# Patient Record
Sex: MALE | Race: OTHER | HISPANIC OR LATINO
[De-identification: names, ages, dates, MRNs, and addresses within clinical notes are randomized per-mention and may not be internally consistent; named-entity substitution may affect disease eponyms.]

---

## 2023-10-10 ENCOUNTER — APPOINTMENT (OUTPATIENT)
Dept: PULMONOLOGY | Facility: CLINIC | Age: 38
End: 2023-10-10
Payer: MEDICAID

## 2023-10-10 VITALS
DIASTOLIC BLOOD PRESSURE: 72 MMHG | OXYGEN SATURATION: 97 % | HEIGHT: 66 IN | WEIGHT: 212 LBS | HEART RATE: 84 BPM | RESPIRATION RATE: 16 BRPM | BODY MASS INDEX: 34.07 KG/M2 | SYSTOLIC BLOOD PRESSURE: 125 MMHG

## 2023-10-10 DIAGNOSIS — Z82.49 FAMILY HISTORY OF ISCHEMIC HEART DISEASE AND OTHER DISEASES OF THE CIRCULATORY SYSTEM: ICD-10-CM

## 2023-10-10 DIAGNOSIS — Z78.9 OTHER SPECIFIED HEALTH STATUS: ICD-10-CM

## 2023-10-10 DIAGNOSIS — Z86.59 PERSONAL HISTORY OF OTHER MENTAL AND BEHAVIORAL DISORDERS: ICD-10-CM

## 2023-10-10 DIAGNOSIS — Z87.898 PERSONAL HISTORY OF OTHER SPECIFIED CONDITIONS: ICD-10-CM

## 2023-10-10 DIAGNOSIS — Z86.79 PERSONAL HISTORY OF OTHER DISEASES OF THE CIRCULATORY SYSTEM: ICD-10-CM

## 2023-10-10 DIAGNOSIS — Z83.3 FAMILY HISTORY OF DIABETES MELLITUS: ICD-10-CM

## 2023-10-10 DIAGNOSIS — R53.83 OTHER FATIGUE: ICD-10-CM

## 2023-10-10 PROBLEM — Z00.00 ENCOUNTER FOR PREVENTIVE HEALTH EXAMINATION: Status: ACTIVE | Noted: 2023-10-10

## 2023-10-10 PROCEDURE — 99204 OFFICE O/P NEW MOD 45 MIN: CPT

## 2023-10-17 ENCOUNTER — OUTPATIENT (OUTPATIENT)
Dept: OUTPATIENT SERVICES | Facility: HOSPITAL | Age: 38
LOS: 1 days | End: 2023-10-17
Payer: COMMERCIAL

## 2023-10-17 DIAGNOSIS — G47.33 OBSTRUCTIVE SLEEP APNEA (ADULT) (PEDIATRIC): ICD-10-CM

## 2023-10-17 PROCEDURE — 95800 SLP STDY UNATTENDED: CPT

## 2023-10-17 PROCEDURE — 95800 SLP STDY UNATTENDED: CPT | Mod: 26

## 2023-12-10 ENCOUNTER — OUTPATIENT (OUTPATIENT)
Dept: OUTPATIENT SERVICES | Facility: HOSPITAL | Age: 38
LOS: 1 days | End: 2023-12-10
Payer: COMMERCIAL

## 2023-12-10 ENCOUNTER — APPOINTMENT (OUTPATIENT)
Dept: RADIOLOGY | Facility: IMAGING CENTER | Age: 38
End: 2023-12-10

## 2023-12-10 ENCOUNTER — RESULT REVIEW (OUTPATIENT)
Age: 38
End: 2023-12-10

## 2023-12-10 DIAGNOSIS — Z00.8 ENCOUNTER FOR OTHER GENERAL EXAMINATION: ICD-10-CM

## 2023-12-10 PROCEDURE — 71046 X-RAY EXAM CHEST 2 VIEWS: CPT

## 2023-12-10 PROCEDURE — 71046 X-RAY EXAM CHEST 2 VIEWS: CPT | Mod: 26

## 2023-12-29 ENCOUNTER — APPOINTMENT (OUTPATIENT)
Dept: PULMONOLOGY | Facility: CLINIC | Age: 38
End: 2023-12-29
Payer: MEDICAID

## 2023-12-29 VITALS
RESPIRATION RATE: 16 BRPM | HEART RATE: 74 BPM | DIASTOLIC BLOOD PRESSURE: 78 MMHG | OXYGEN SATURATION: 97 % | SYSTOLIC BLOOD PRESSURE: 140 MMHG

## 2023-12-29 VITALS — WEIGHT: 218 LBS | HEIGHT: 64 IN | BODY MASS INDEX: 37.22 KG/M2

## 2023-12-29 DIAGNOSIS — E66.9 OBESITY, UNSPECIFIED: ICD-10-CM

## 2023-12-29 DIAGNOSIS — Z78.9 OTHER SPECIFIED HEALTH STATUS: ICD-10-CM

## 2023-12-29 DIAGNOSIS — G47.33 OBSTRUCTIVE SLEEP APNEA (ADULT) (PEDIATRIC): ICD-10-CM

## 2023-12-29 DIAGNOSIS — R06.02 SHORTNESS OF BREATH: ICD-10-CM

## 2023-12-29 PROCEDURE — 94010 BREATHING CAPACITY TEST: CPT

## 2023-12-29 PROCEDURE — 99214 OFFICE O/P EST MOD 30 MIN: CPT | Mod: 25

## 2023-12-29 NOTE — REVIEW OF SYSTEMS
[Fatigue] : fatigue [SOB on Exertion] : sob on exertion [GERD] : gerd [Depression] : depression [Obesity] : obesity [Negative] : Neurologic

## 2023-12-29 NOTE — HISTORY OF PRESENT ILLNESS
[Excessive Daytime Sleepiness] : excessive daytime sleepiness [Witnessed Apnea During Sleep] : witnessed apnea during sleep [Witnessed Gasping During Sleep] : witnessed gasping during sleep [Snoring] : snoring [Unrefreshing Sleep] : unrefreshing sleep [Sleepy When Sedentary] : sleepy when sedentary [Follow-Up - Routine Clinic] : a routine clinic follow-up of [Excess Weight] : excess weight [Currently Experiencing] : The patient is currently experiencing symptoms. [Dyspnea] : dyspnea [Low Calorie Diet] : low calorie diet [Fair Compliance] : fair compliance with treatment [Fair Tolerance] : fair tolerance of treatment [Poor Symptom Control] : poor symptom control [None] : No associated conditions are noted [High] : high [Low Calorie] : low calorie [Well Balanced Diet] : well balanced meals [___ Times/Week] : exercises [unfilled] times per week [Aerobic Conditioning] : aerobic conditioning [TextBox_4] : Never smoked. Reports poor sleep. [ESS] : 11

## 2023-12-29 NOTE — PHYSICAL EXAM
[No Acute Distress] : no acute distress [Low Lying Soft Palate] : low lying soft palate [Enlarged Base of the Tongue] : enlarged base of the tongue [II] : Mallampati Class: II [Normal Appearance] : normal appearance [Supple] : supple [Normal Rate/Rhythm] : normal rate/rhythm [Normal S1, S2] : normal s1, s2 [No Murmurs] : no murmurs [No Resp Distress] : no resp distress [No Acc Muscle Use] : no acc muscle use [Normal Rhythm and Effort] : normal rhythm and effort [Clear to Auscultation Bilaterally] : clear to auscultation bilaterally [No Abnormalities] : no abnormalities [Benign] : benign [Not Tender] : not tender [Soft] : soft [No Clubbing] : no clubbing [No Edema] : no edema [No Focal Deficits] : no focal deficits [Oriented x3] : oriented x3 [TextBox_11] : Moderate oropharyngeal crowding.

## 2023-12-29 NOTE — DISCUSSION/SUMMARY
[FreeTextEntry1] : #1. Nhan from 12/29/23 was normal. #2. The patient does not appear to require chronic BD therapy at this time. #3. Diet and exercise for weight loss. #4. SOBOE is likely at least somewhat related to weight or deconditioning.   #5. Start autoCPAP to treat moderate SERGE with an AHI of 16.5; encouraged at least 70% compliance. #6. Replace PAP equipment as needed; ordered 12/29/23. #7. CXR to evaluate SOBOE was clear on 12/29/23.. #8. Pt had both Covid vaccines. #9. F/u in 2 months with compliance. #10. Referred patient for evaluation by obesity medicine. #11. Consider dental evaluation for an oral appliance as alternative therapy for treatment of SERGE if clinically indicated or if does not tolerate CPAP.   The patient expressed understanding and agreement with the above recommendations/plan and accepts responsibility to be compliant with recommended testing, therapies, and f/u visits. All relevant questions and concerns were addressed.  Discussed above with patient and aunt who was also present.

## 2023-12-29 NOTE — REASON FOR VISIT
[Follow-Up] : a follow-up visit [Sleep Apnea] : sleep apnea [Shortness of Breath] : shortness of breath [Obesity] : obesity [Ad Hoc ] : provided by an ad hoc  [Interpreters_FullName] : Lino [Interpreters_Relationshiptopatient] : MA [TWNoteComboBox1] : [place]

## 2023-12-29 NOTE — END OF VISIT
[Time Spent: ___ minutes] : I have spent [unfilled] minutes of time on the encounter. [TextEntry] : Discussed with pt at length regarding SERGE, obesity, fatigue, soboe; reviewed w/u with pt as above.

## 2023-12-29 NOTE — CONSULT LETTER
[Dear  ___] : Dear  [unfilled], [Consult Letter:] : I had the pleasure of evaluating your patient, [unfilled]. [Please see my note below.] : Please see my note below. [Consult Closing:] : Thank you very much for allowing me to participate in the care of this patient.  If you have any questions, please do not hesitate to contact me. [Sincerely,] : Sincerely, [FreeTextEntry3] : Kan Agarwal MD, FCCP, D. [facility] Pulmonary and Sleep Medicine [facility] at [place]

## 2023-12-29 NOTE — RESULTS/DATA
decreased ROM/decreased strength
decreased strength
[TextEntry] : CXR from 10/10/23 was clear - reviewed results and films with patient. Home PSG from 10/17/23 revealed moderate SERGE with an AHI of 16.5.

## 2024-02-27 ENCOUNTER — APPOINTMENT (OUTPATIENT)
Dept: PULMONOLOGY | Facility: CLINIC | Age: 39
End: 2024-02-27

## 2024-07-10 ENCOUNTER — APPOINTMENT (OUTPATIENT)
Dept: SURGERY | Facility: CLINIC | Age: 39
End: 2024-07-10
Payer: COMMERCIAL

## 2024-07-10 ENCOUNTER — APPOINTMENT (OUTPATIENT)
Dept: BARIATRICS/WEIGHT MGMT | Facility: CLINIC | Age: 39
End: 2024-07-10

## 2024-07-10 VITALS
RESPIRATION RATE: 16 BRPM | BODY MASS INDEX: 36.4 KG/M2 | DIASTOLIC BLOOD PRESSURE: 87 MMHG | OXYGEN SATURATION: 96 % | TEMPERATURE: 98.2 F | HEART RATE: 85 BPM | HEIGHT: 64.5 IN | WEIGHT: 215.8 LBS | SYSTOLIC BLOOD PRESSURE: 137 MMHG

## 2024-07-10 DIAGNOSIS — Z78.9 OTHER SPECIFIED HEALTH STATUS: ICD-10-CM

## 2024-07-10 DIAGNOSIS — E66.01 MORBID (SEVERE) OBESITY DUE TO EXCESS CALORIES: ICD-10-CM

## 2024-07-10 DIAGNOSIS — G47.33 OBSTRUCTIVE SLEEP APNEA (ADULT) (PEDIATRIC): ICD-10-CM

## 2024-07-10 PROCEDURE — 99204 OFFICE O/P NEW MOD 45 MIN: CPT

## 2024-07-15 ENCOUNTER — APPOINTMENT (OUTPATIENT)
Dept: INTERNAL MEDICINE | Facility: CLINIC | Age: 39
End: 2024-07-15
Payer: MEDICAID

## 2024-07-15 VITALS
HEIGHT: 64.5 IN | TEMPERATURE: 98.6 F | WEIGHT: 216 LBS | SYSTOLIC BLOOD PRESSURE: 132 MMHG | HEART RATE: 76 BPM | OXYGEN SATURATION: 98 % | DIASTOLIC BLOOD PRESSURE: 80 MMHG | BODY MASS INDEX: 36.43 KG/M2

## 2024-07-15 PROCEDURE — 99203 OFFICE O/P NEW LOW 30 MIN: CPT

## 2024-07-16 ENCOUNTER — NON-APPOINTMENT (OUTPATIENT)
Age: 39
End: 2024-07-16

## 2024-07-18 ENCOUNTER — TRANSCRIPTION ENCOUNTER (OUTPATIENT)
Age: 39
End: 2024-07-18

## 2024-07-18 ENCOUNTER — APPOINTMENT (OUTPATIENT)
Dept: GASTROENTEROLOGY | Facility: CLINIC | Age: 39
End: 2024-07-18
Payer: COMMERCIAL

## 2024-07-18 VITALS
WEIGHT: 216 LBS | OXYGEN SATURATION: 98 % | HEART RATE: 70 BPM | SYSTOLIC BLOOD PRESSURE: 130 MMHG | RESPIRATION RATE: 16 BRPM | DIASTOLIC BLOOD PRESSURE: 70 MMHG | BODY MASS INDEX: 36.43 KG/M2 | HEIGHT: 64.5 IN

## 2024-07-18 DIAGNOSIS — Z01.818 ENCOUNTER FOR OTHER PREPROCEDURAL EXAMINATION: ICD-10-CM

## 2024-07-18 DIAGNOSIS — E66.9 OBESITY, UNSPECIFIED: ICD-10-CM

## 2024-07-18 PROCEDURE — 99203 OFFICE O/P NEW LOW 30 MIN: CPT

## 2024-08-13 ENCOUNTER — APPOINTMENT (OUTPATIENT)
Dept: PULMONOLOGY | Facility: CLINIC | Age: 39
End: 2024-08-13

## 2024-08-13 ENCOUNTER — APPOINTMENT (OUTPATIENT)
Dept: PULMONOLOGY | Facility: CLINIC | Age: 39
End: 2024-08-13
Payer: COMMERCIAL

## 2024-08-13 VITALS — BODY MASS INDEX: 36.76 KG/M2 | HEIGHT: 64.5 IN | WEIGHT: 218 LBS

## 2024-08-13 VITALS
DIASTOLIC BLOOD PRESSURE: 78 MMHG | HEART RATE: 100 BPM | SYSTOLIC BLOOD PRESSURE: 124 MMHG | RESPIRATION RATE: 16 BRPM | OXYGEN SATURATION: 98 %

## 2024-08-13 DIAGNOSIS — R06.02 SHORTNESS OF BREATH: ICD-10-CM

## 2024-08-13 DIAGNOSIS — Z78.9 OTHER SPECIFIED HEALTH STATUS: ICD-10-CM

## 2024-08-13 DIAGNOSIS — E66.9 OBESITY, UNSPECIFIED: ICD-10-CM

## 2024-08-13 DIAGNOSIS — Z01.818 ENCOUNTER FOR OTHER PREPROCEDURAL EXAMINATION: ICD-10-CM

## 2024-08-13 DIAGNOSIS — G47.33 OBSTRUCTIVE SLEEP APNEA (ADULT) (PEDIATRIC): ICD-10-CM

## 2024-08-13 PROCEDURE — 94010 BREATHING CAPACITY TEST: CPT

## 2024-08-13 PROCEDURE — 99215 OFFICE O/P EST HI 40 MIN: CPT | Mod: 25

## 2024-08-13 NOTE — CONSULT LETTER
[Dear  ___] : Dear  [unfilled], [Consult Letter:] : I had the pleasure of evaluating your patient, [unfilled]. [Please see my note below.] : Please see my note below. [Consult Closing:] : Thank you very much for allowing me to participate in the care of this patient.  If you have any questions, please do not hesitate to contact me. [Sincerely,] : Sincerely, [FreeTextEntry3] : Kan Agarwal MD, FCCP, D. [facility] Pulmonary and Sleep Medicine [facility] at [place]

## 2024-08-13 NOTE — REASON FOR VISIT
[Follow-Up] : a follow-up visit [Sleep Apnea] : sleep apnea [Shortness of Breath] : shortness of breath [Obesity] : obesity [Ad Hoc ] : provided by an ad hoc  [Pre-op Risk Stratification] : pre-op risk stratification [Interpreters_FullName] : Sunny [Interpreters_Relationshiptopatient] : MA [TWNoteComboBox1] : Czech

## 2024-08-13 NOTE — DISCUSSION/SUMMARY
[FreeTextEntry1] : #1. Neida from 12/29/23 was normal. Repeat neida from 8/13/24 was normal. #2. The patient does not appear to require chronic BD therapy at this time. #3. Diet and exercise for weight loss. #4. SOBOE is likely at least somewhat related to weight or deconditioning.   #5. Restart autoCPAP to treat moderate SERGE with an AHI of 16.5; encouraged at least 70% compliance. Pt has not been compliant with therapy. Consider repeat HST with weight loss for possible improvement/resolution. #6. Replace PAP equipment as needed; ordered 12/29/23. #7. CXR to evaluate SOBOE was clear on 12/29/23. #8. Pt had both Covid vaccines. #9. F/u in 2 months with compliance. #10. Referred patient for evaluation by obesity medicine. #11. Consider dental evaluation for an oral appliance as alternative therapy for treatment of SERGE if clinically indicated or if does not tolerate CPAP.  #12. There is no pulmonary contraindication for the patient's upcoming endoscopy.  I would recommend that his APAP should be available for him to use post-op and with sleep. I would also recommend post op incentive spirometry, GI/DVT prophylaxis as needed, early ambulation as able, and adequate pain control. Would recommend that O2 saturation should be monitored during and after the procedure.  The patient expressed understanding and agreement with the above recommendations/plan and accepts responsibility to be compliant with recommended testing, therapies, and f/u visits. All relevant questions and concerns were addressed.

## 2024-08-13 NOTE — REASON FOR VISIT
[Follow-Up] : a follow-up visit [Sleep Apnea] : sleep apnea [Shortness of Breath] : shortness of breath [Obesity] : obesity [Ad Hoc ] : provided by an ad hoc  [Pre-op Risk Stratification] : pre-op risk stratification [Interpreters_FullName] : Sunny [Interpreters_Relationshiptopatient] : MA [TWNoteComboBox1] : [place]

## 2024-08-13 NOTE — CONSULT LETTER
[Dear  ___] : Dear  [unfilled], [Consult Letter:] : I had the pleasure of evaluating your patient, [unfilled]. [Please see my note below.] : Please see my note below. [Consult Closing:] : Thank you very much for allowing me to participate in the care of this patient.  If you have any questions, please do not hesitate to contact me. [Sincerely,] : Sincerely, [FreeTextEntry3] : Kan Agarwal MD, FCCP, D. [facility] Pulmonary and Sleep Medicine [facility] at [place]

## 2024-08-13 NOTE — HISTORY OF PRESENT ILLNESS
[Excessive Daytime Sleepiness] : excessive daytime sleepiness [Witnessed Apnea During Sleep] : witnessed apnea during sleep [Witnessed Gasping During Sleep] : witnessed gasping during sleep [Snoring] : snoring [Unrefreshing Sleep] : unrefreshing sleep [Sleepy When Sedentary] : sleepy when sedentary [Follow-Up - Routine Clinic] : a routine clinic follow-up of [Excess Weight] : excess weight [Currently Experiencing] : The patient is currently experiencing symptoms. [Dyspnea] : dyspnea [Low Calorie Diet] : low calorie diet [Fair Compliance] : fair compliance with treatment [Fair Tolerance] : fair tolerance of treatment [Poor Symptom Control] : poor symptom control [None] : No associated conditions are noted [High] : high [Low Calorie] : low calorie [Well Balanced Diet] : well balanced meals [___ Times/Week] : exercises [unfilled] times per week [Aerobic Conditioning] : aerobic conditioning [TextBox_4] : Never smoked. Reports poor sleep. Not compliant with PAP therapy. Pt presents for evaluation prior to his endoscopy which he requires prior to his bariatric surgery.  [ESS] : 11

## 2024-08-13 NOTE — RESULTS/DATA
[TextEntry] : CXR from 10/10/23 was clear - reviewed results and films with patient. Home PSG from 10/17/23 revealed moderate SERGE with an AHI of 16.5.

## 2024-08-30 ENCOUNTER — APPOINTMENT (OUTPATIENT)
Dept: BARIATRICS | Facility: CLINIC | Age: 39
End: 2024-08-30

## 2024-08-30 VITALS
HEART RATE: 92 BPM | HEIGHT: 64.5 IN | WEIGHT: 214.8 LBS | OXYGEN SATURATION: 97 % | DIASTOLIC BLOOD PRESSURE: 86 MMHG | TEMPERATURE: 97.5 F | RESPIRATION RATE: 16 BRPM | SYSTOLIC BLOOD PRESSURE: 137 MMHG | BODY MASS INDEX: 36.22 KG/M2

## 2024-08-30 DIAGNOSIS — E66.9 OBESITY, UNSPECIFIED: ICD-10-CM

## 2024-08-30 PROCEDURE — 97802 MEDICAL NUTRITION INDIV IN: CPT

## 2024-09-03 DIAGNOSIS — E55.9 VITAMIN D DEFICIENCY, UNSPECIFIED: ICD-10-CM

## 2024-09-03 RX ORDER — ERGOCALCIFEROL 1.25 MG/1
50000 CAPSULE ORAL
Qty: 12 | Refills: 0 | Status: ACTIVE | COMMUNITY
Start: 2024-09-03 | End: 1900-01-01

## 2024-09-09 ENCOUNTER — APPOINTMENT (OUTPATIENT)
Dept: GASTROENTEROLOGY | Facility: HOSPITAL | Age: 39
End: 2024-09-09

## 2024-09-09 ENCOUNTER — RESULT REVIEW (OUTPATIENT)
Age: 39
End: 2024-09-09

## 2024-09-09 ENCOUNTER — TRANSCRIPTION ENCOUNTER (OUTPATIENT)
Age: 39
End: 2024-09-09

## 2024-09-09 ENCOUNTER — OUTPATIENT (OUTPATIENT)
Dept: OUTPATIENT SERVICES | Facility: HOSPITAL | Age: 39
LOS: 1 days | End: 2024-09-09
Payer: COMMERCIAL

## 2024-09-09 DIAGNOSIS — E66.9 OBESITY, UNSPECIFIED: ICD-10-CM

## 2024-09-09 PROCEDURE — 88342 IMHCHEM/IMCYTCHM 1ST ANTB: CPT | Mod: 26

## 2024-09-09 PROCEDURE — 43239 EGD BIOPSY SINGLE/MULTIPLE: CPT

## 2024-09-09 PROCEDURE — 88342 IMHCHEM/IMCYTCHM 1ST ANTB: CPT

## 2024-09-09 PROCEDURE — 88305 TISSUE EXAM BY PATHOLOGIST: CPT | Mod: 26

## 2024-09-09 PROCEDURE — 88305 TISSUE EXAM BY PATHOLOGIST: CPT

## 2024-09-11 LAB — SURGICAL PATHOLOGY STUDY: SIGNIFICANT CHANGE UP

## 2024-09-12 ENCOUNTER — NON-APPOINTMENT (OUTPATIENT)
Age: 39
End: 2024-09-12

## 2024-09-12 DIAGNOSIS — A04.8 OTHER SPECIFIED BACTERIAL INTESTINAL INFECTIONS: ICD-10-CM

## 2024-09-12 RX ORDER — CLARITHROMYCIN 500 MG/1
500 TABLET, FILM COATED ORAL
Qty: 28 | Refills: 0 | Status: ACTIVE | COMMUNITY
Start: 2024-09-12 | End: 1900-01-01

## 2024-09-12 RX ORDER — METRONIDAZOLE 500 MG/1
500 TABLET ORAL TWICE DAILY
Qty: 28 | Refills: 0 | Status: ACTIVE | COMMUNITY
Start: 2024-09-12 | End: 1900-01-01

## 2024-09-12 RX ORDER — OMEPRAZOLE 20 MG/1
20 CAPSULE, DELAYED RELEASE ORAL TWICE DAILY
Qty: 28 | Refills: 0 | Status: ACTIVE | COMMUNITY
Start: 2024-09-12 | End: 1900-01-01

## 2024-09-12 RX ORDER — AMOXICILLIN 500 MG/1
500 TABLET, FILM COATED ORAL TWICE DAILY
Qty: 56 | Refills: 0 | Status: ACTIVE | COMMUNITY
Start: 2024-09-12 | End: 1900-01-01

## 2024-09-23 ENCOUNTER — APPOINTMENT (OUTPATIENT)
Dept: BARIATRICS | Facility: CLINIC | Age: 39
End: 2024-09-23
Payer: COMMERCIAL

## 2024-09-23 VITALS
BODY MASS INDEX: 35.82 KG/M2 | HEART RATE: 79 BPM | DIASTOLIC BLOOD PRESSURE: 83 MMHG | HEIGHT: 64.5 IN | TEMPERATURE: 98.4 F | SYSTOLIC BLOOD PRESSURE: 132 MMHG | WEIGHT: 212.4 LBS | RESPIRATION RATE: 16 BRPM | OXYGEN SATURATION: 98 %

## 2024-09-23 PROCEDURE — 97802 MEDICAL NUTRITION INDIV IN: CPT

## 2024-09-25 ENCOUNTER — APPOINTMENT (OUTPATIENT)
Dept: PULMONOLOGY | Facility: CLINIC | Age: 39
End: 2024-09-25
Payer: COMMERCIAL

## 2024-09-25 VITALS
SYSTOLIC BLOOD PRESSURE: 140 MMHG | DIASTOLIC BLOOD PRESSURE: 84 MMHG | WEIGHT: 214 LBS | HEART RATE: 85 BPM | HEIGHT: 64.5 IN | OXYGEN SATURATION: 98 % | RESPIRATION RATE: 16 BRPM | BODY MASS INDEX: 36.09 KG/M2

## 2024-09-25 DIAGNOSIS — E66.9 OBESITY, UNSPECIFIED: ICD-10-CM

## 2024-09-25 DIAGNOSIS — Z78.9 OTHER SPECIFIED HEALTH STATUS: ICD-10-CM

## 2024-09-25 DIAGNOSIS — R06.02 SHORTNESS OF BREATH: ICD-10-CM

## 2024-09-25 DIAGNOSIS — G47.33 OBSTRUCTIVE SLEEP APNEA (ADULT) (PEDIATRIC): ICD-10-CM

## 2024-09-25 DIAGNOSIS — Z01.818 ENCOUNTER FOR OTHER PREPROCEDURAL EXAMINATION: ICD-10-CM

## 2024-09-25 PROCEDURE — 99214 OFFICE O/P EST MOD 30 MIN: CPT

## 2024-09-25 NOTE — RESULTS/DATA
[TextEntry] : CXR from 10/10/23 was clear - reviewed results and films with patient. Home PSG from 10/17/23 revealed moderate SERGE with an AHI of 16.5. The patient's overall compliance is 33% with a >4hr compliance of 7% on autoCPAP with a median and max pressure of 4.5 and 11.1 cm of water, respectively with a residual AHI of 5.9 which is near normal.

## 2024-09-25 NOTE — DISCUSSION/SUMMARY
[FreeTextEntry1] : #1. Neida from 12/29/23 was normal. Repeat neida from 8/13/24 was normal. #2. The patient does not appear to require chronic BD therapy at this time. #3. Diet and exercise for weight loss. #4. SOBOE is likely at least somewhat related to weight or deconditioning.   #5. Continue autoCPAP to treat moderate SERGE with an AHI of 16.5; encouraged at least 70% compliance. Pt has not been compliant with therapy. Consider repeat HST with weight loss for possible improvement or resolution. #6. Replace PAP equipment as needed; ordered 12/29/23. #7. CXR to evaluate SOBOE was clear on 12/29/23. #8. Pt had both Covid vaccines. #9. F/u in 2 months with compliance. #10. Referred patient for evaluation by obesity medicine. #11. Consider dental evaluation for an oral appliance as alternative therapy for treatment of SERGE if clinically indicated or if does not tolerate CPAP.  #12. There is no pulmonary contraindication for the patient's upcoming bariatric surgery.  I would recommend that his APAP should be available for him to use post-op and with sleep. Alternatively, could use CPAP at 10 cm of water. I would also recommend post op incentive spirometry, GI/DVT prophylaxis as needed, early ambulation as able, and adequate pain control. Would recommend that O2 saturation should be monitored during and after the procedure.  The patient expressed understanding and agreement with the above recommendations/plan and accepts responsibility to be compliant with recommended testing, therapies, and f/u visits. All relevant questions and concerns were addressed.

## 2024-09-25 NOTE — CONSULT LETTER
[Dear  ___] : Dear  [unfilled], [Consult Letter:] : I had the pleasure of evaluating your patient, [unfilled]. [Please see my note below.] : Please see my note below. [Consult Closing:] : Thank you very much for allowing me to participate in the care of this patient.  If you have any questions, please do not hesitate to contact me. [Sincerely,] : Sincerely, [FreeTextEntry3] : Kan Agarwal MD, FCCP, D. ABS[facility] Pulmonary and Sleep Medicine [facility] at [place]

## 2024-09-25 NOTE — REASON FOR VISIT
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

## 2024-09-25 NOTE — HISTORY OF PRESENT ILLNESS
[Excessive Daytime Sleepiness] : excessive daytime sleepiness [Witnessed Apnea During Sleep] : witnessed apnea during sleep [Witnessed Gasping During Sleep] : witnessed gasping during sleep [Snoring] : snoring [Unrefreshing Sleep] : unrefreshing sleep [Sleepy When Sedentary] : sleepy when sedentary [Follow-Up - Routine Clinic] : a routine clinic follow-up of [Excess Weight] : excess weight [Currently Experiencing] : The patient is currently experiencing symptoms. [Dyspnea] : dyspnea [Low Calorie Diet] : low calorie diet [Fair Compliance] : fair compliance with treatment [Fair Tolerance] : fair tolerance of treatment [Poor Symptom Control] : poor symptom control [None] : No associated conditions are noted [High] : high [Low Calorie] : low calorie [Well Balanced Diet] : well balanced meals [___ Times/Week] : exercises [unfilled] times per week [Aerobic Conditioning] : aerobic conditioning [TextBox_4] : Never smoked. Reports poor sleep. Not compliant with PAP therapy. The patient presents for pulmonary evaluation prior to bariatric surgery.  [ESS] : 11

## 2024-09-25 NOTE — END OF VISIT
[Time Spent: ___ minutes] : I have spent [unfilled] minutes of time on the encounter which excludes teaching and separately reported services. [TextEntry] : Discussed with pt at length regarding SERGE, obesity, fatigue, soboe; reviewed w/u with pt as above.

## 2024-10-21 ENCOUNTER — APPOINTMENT (OUTPATIENT)
Dept: BARIATRICS | Facility: CLINIC | Age: 39
End: 2024-10-21

## 2024-10-21 VITALS
RESPIRATION RATE: 16 BRPM | HEIGHT: 64.5 IN | HEART RATE: 75 BPM | TEMPERATURE: 98 F | DIASTOLIC BLOOD PRESSURE: 84 MMHG | BODY MASS INDEX: 35.82 KG/M2 | OXYGEN SATURATION: 99 % | SYSTOLIC BLOOD PRESSURE: 127 MMHG | WEIGHT: 212.4 LBS

## 2024-10-21 DIAGNOSIS — E66.9 OBESITY, UNSPECIFIED: ICD-10-CM

## 2024-10-21 PROCEDURE — 97803 MED NUTRITION INDIV SUBSEQ: CPT

## 2024-10-24 DIAGNOSIS — E66.01 MORBID (SEVERE) OBESITY DUE TO EXCESS CALORIES: ICD-10-CM

## 2024-11-05 ENCOUNTER — APPOINTMENT (OUTPATIENT)
Dept: GASTROENTEROLOGY | Facility: CLINIC | Age: 39
End: 2024-11-05

## 2024-11-20 ENCOUNTER — OUTPATIENT (OUTPATIENT)
Dept: OUTPATIENT SERVICES | Facility: HOSPITAL | Age: 39
LOS: 1 days | End: 2024-11-20
Payer: COMMERCIAL

## 2024-11-20 VITALS
WEIGHT: 220.46 LBS | HEART RATE: 81 BPM | DIASTOLIC BLOOD PRESSURE: 70 MMHG | HEIGHT: 65 IN | SYSTOLIC BLOOD PRESSURE: 110 MMHG | TEMPERATURE: 98 F | RESPIRATION RATE: 16 BRPM | OXYGEN SATURATION: 98 %

## 2024-11-20 DIAGNOSIS — G47.33 OBSTRUCTIVE SLEEP APNEA (ADULT) (PEDIATRIC): ICD-10-CM

## 2024-11-20 DIAGNOSIS — Z01.818 ENCOUNTER FOR OTHER PREPROCEDURAL EXAMINATION: ICD-10-CM

## 2024-11-20 DIAGNOSIS — Z29.9 ENCOUNTER FOR PROPHYLACTIC MEASURES, UNSPECIFIED: ICD-10-CM

## 2024-11-20 DIAGNOSIS — Z98.890 OTHER SPECIFIED POSTPROCEDURAL STATES: Chronic | ICD-10-CM

## 2024-11-20 DIAGNOSIS — R73.03 PREDIABETES: ICD-10-CM

## 2024-11-20 DIAGNOSIS — E66.01 MORBID (SEVERE) OBESITY DUE TO EXCESS CALORIES: ICD-10-CM

## 2024-11-20 DIAGNOSIS — I10 ESSENTIAL (PRIMARY) HYPERTENSION: ICD-10-CM

## 2024-11-20 DIAGNOSIS — Z13.89 ENCOUNTER FOR SCREENING FOR OTHER DISORDER: ICD-10-CM

## 2024-11-20 LAB
A1C WITH ESTIMATED AVERAGE GLUCOSE RESULT: 6.8 % — HIGH (ref 4–5.6)
ALBUMIN SERPL ELPH-MCNC: 4.2 G/DL — SIGNIFICANT CHANGE UP (ref 3.3–5.2)
ALP SERPL-CCNC: 90 U/L — SIGNIFICANT CHANGE UP (ref 40–120)
ALT FLD-CCNC: 25 U/L — SIGNIFICANT CHANGE UP
ANION GAP SERPL CALC-SCNC: 14 MMOL/L — SIGNIFICANT CHANGE UP (ref 5–17)
AST SERPL-CCNC: 21 U/L — SIGNIFICANT CHANGE UP
BASOPHILS # BLD AUTO: 0.04 K/UL — SIGNIFICANT CHANGE UP (ref 0–0.2)
BASOPHILS NFR BLD AUTO: 0.5 % — SIGNIFICANT CHANGE UP (ref 0–2)
BILIRUB SERPL-MCNC: 0.2 MG/DL — LOW (ref 0.4–2)
BLD GP AB SCN SERPL QL: SIGNIFICANT CHANGE UP
BUN SERPL-MCNC: 12.5 MG/DL — SIGNIFICANT CHANGE UP (ref 8–20)
CALCIUM SERPL-MCNC: 9.4 MG/DL — SIGNIFICANT CHANGE UP (ref 8.4–10.5)
CHLORIDE SERPL-SCNC: 102 MMOL/L — SIGNIFICANT CHANGE UP (ref 96–108)
CO2 SERPL-SCNC: 24 MMOL/L — SIGNIFICANT CHANGE UP (ref 22–29)
CREAT SERPL-MCNC: 0.76 MG/DL — SIGNIFICANT CHANGE UP (ref 0.5–1.3)
EGFR: 119 ML/MIN/1.73M2 — SIGNIFICANT CHANGE UP
EOSINOPHIL # BLD AUTO: 0.12 K/UL — SIGNIFICANT CHANGE UP (ref 0–0.5)
EOSINOPHIL NFR BLD AUTO: 1.6 % — SIGNIFICANT CHANGE UP (ref 0–6)
ESTIMATED AVERAGE GLUCOSE: 148 MG/DL — HIGH (ref 68–114)
GLUCOSE SERPL-MCNC: 118 MG/DL — HIGH (ref 70–99)
HCT VFR BLD CALC: 40.5 % — SIGNIFICANT CHANGE UP (ref 39–50)
HGB BLD-MCNC: 13.3 G/DL — SIGNIFICANT CHANGE UP (ref 13–17)
IMM GRANULOCYTES NFR BLD AUTO: 0.4 % — SIGNIFICANT CHANGE UP (ref 0–0.9)
LYMPHOCYTES # BLD AUTO: 1.76 K/UL — SIGNIFICANT CHANGE UP (ref 1–3.3)
LYMPHOCYTES # BLD AUTO: 23.3 % — SIGNIFICANT CHANGE UP (ref 13–44)
MAGNESIUM SERPL-MCNC: 2.1 MG/DL — SIGNIFICANT CHANGE UP (ref 1.6–2.6)
MCHC RBC-ENTMCNC: 28.5 PG — SIGNIFICANT CHANGE UP (ref 27–34)
MCHC RBC-ENTMCNC: 32.8 G/DL — SIGNIFICANT CHANGE UP (ref 32–36)
MCV RBC AUTO: 86.7 FL — SIGNIFICANT CHANGE UP (ref 80–100)
MONOCYTES # BLD AUTO: 0.56 K/UL — SIGNIFICANT CHANGE UP (ref 0–0.9)
MONOCYTES NFR BLD AUTO: 7.4 % — SIGNIFICANT CHANGE UP (ref 2–14)
NEUTROPHILS # BLD AUTO: 5.05 K/UL — SIGNIFICANT CHANGE UP (ref 1.8–7.4)
NEUTROPHILS NFR BLD AUTO: 66.8 % — SIGNIFICANT CHANGE UP (ref 43–77)
PHOSPHATE SERPL-MCNC: 3.5 MG/DL — SIGNIFICANT CHANGE UP (ref 2.4–4.7)
PLATELET # BLD AUTO: 353 K/UL — SIGNIFICANT CHANGE UP (ref 150–400)
POTASSIUM SERPL-MCNC: 4.3 MMOL/L — SIGNIFICANT CHANGE UP (ref 3.5–5.3)
POTASSIUM SERPL-SCNC: 4.3 MMOL/L — SIGNIFICANT CHANGE UP (ref 3.5–5.3)
PROT SERPL-MCNC: 7.4 G/DL — SIGNIFICANT CHANGE UP (ref 6.6–8.7)
RBC # BLD: 4.67 M/UL — SIGNIFICANT CHANGE UP (ref 4.2–5.8)
RBC # FLD: 12.8 % — SIGNIFICANT CHANGE UP (ref 10.3–14.5)
SODIUM SERPL-SCNC: 139 MMOL/L — SIGNIFICANT CHANGE UP (ref 135–145)
WBC # BLD: 7.56 K/UL — SIGNIFICANT CHANGE UP (ref 3.8–10.5)
WBC # FLD AUTO: 7.56 K/UL — SIGNIFICANT CHANGE UP (ref 3.8–10.5)

## 2024-11-20 PROCEDURE — 80053 COMPREHEN METABOLIC PANEL: CPT

## 2024-11-20 PROCEDURE — 86850 RBC ANTIBODY SCREEN: CPT

## 2024-11-20 PROCEDURE — G0463: CPT

## 2024-11-20 PROCEDURE — 83036 HEMOGLOBIN GLYCOSYLATED A1C: CPT

## 2024-11-20 PROCEDURE — 84100 ASSAY OF PHOSPHORUS: CPT

## 2024-11-20 PROCEDURE — 86900 BLOOD TYPING SEROLOGIC ABO: CPT

## 2024-11-20 PROCEDURE — 86901 BLOOD TYPING SEROLOGIC RH(D): CPT

## 2024-11-20 PROCEDURE — 93010 ELECTROCARDIOGRAM REPORT: CPT

## 2024-11-20 PROCEDURE — 93005 ELECTROCARDIOGRAM TRACING: CPT

## 2024-11-20 PROCEDURE — 85025 COMPLETE CBC W/AUTO DIFF WBC: CPT

## 2024-11-20 PROCEDURE — 36415 COLL VENOUS BLD VENIPUNCTURE: CPT

## 2024-11-20 PROCEDURE — 83735 ASSAY OF MAGNESIUM: CPT

## 2024-11-20 NOTE — H&P PST ADULT - NSICDXFAMILYHX_GEN_ALL_CORE_FT
FAMILY HISTORY:  Father  Still living? Unknown  FH: HTN (hypertension), Age at diagnosis: Age Unknown    Mother  Still living? Unknown  FH: diabetes mellitus, Age at diagnosis: Age Unknown  FH: HTN (hypertension), Age at diagnosis: Age Unknown

## 2024-11-20 NOTE — H&P PST ADULT - HISTORY OF PRESENT ILLNESS
38 yo M PMH of HTN, SERGE, GERD, prediabetes, morbid obesity (5 ft 4.5 inch, 215.12 lbs, BMI 36.4), presents for preop assessment prior to  weight loss surgery. Patient has been more than 75 lb overweight for greater than 5 years. He had tried numerous weight loss programs including self-directed and physician supervised diets and self-directed exercise programs, lost greater than 10 pounds on more than one occasion, however, has experienced weight regain despite his best efforts with healthy diet and exercise. Scheduled for robotic sleeve gastrectomy w/Dr Ortiz on 12/10/2024, pending medical and pulmonary clearances 40 yo M PMH of HTN, SERGE on CPAP, GERD, prediabetes, morbid obesity (height 5'5", weight 220.4 lbs, BMI 36.7), presents for preop assessment prior to weight loss surgery. Patient has been more than 75 lb overweight for greater than 5 years. He had tried numerous weight loss programs including self-directed and physician supervised diets and self-directed exercise, lost greater than 10 pounds on more than one occasion, however, has experienced weight regain despite his best efforts with healthy diet and exercise. Scheduled for robotic sleeve gastrectomy w/Dr Ortiz on 12/10/2024, pending medical and pulmonary clearances

## 2024-11-20 NOTE — H&P PST ADULT - GASTROINTESTINAL
obese/soft/nontender/nondistended/normal active bowel sounds/no guarding details… obese/soft/nontender/nondistended/normal active bowel sounds/no guarding/no rigidity

## 2024-11-20 NOTE — H&P PST ADULT - PROBLEM SELECTOR PLAN 5
preop assessment, scheduled for robotic sleeve gastrectomy w/Dr Ortiz on 12/10/2024, pending medical and pulmonary clearances

## 2024-11-20 NOTE — H&P PST ADULT - ASSESSMENT
38 yo M PMH of HTN, SERGE, GERD, morbid obesity (5 ft 4.5 inch, 215.12 lbs, BMI 36.4), presents for preop assessment prior to  weight loss surgery. Patient has been more than 75 lb overweight for greater than 5 years. He had tried numerous weight loss programs including self-directed and physician supervised diets and self-directed exercise programs, lost greater than 10 pounds on more than one occasion, however, has experienced weight regain despite his best efforts with healthy diet and exercise. Scheduled for robotic sleeve gastrectomy w/Dr Ortiz on 12/10/2024, pending medical and pulmonary clearances    -Patient educated on ERP protocol (written/verbal)   -Patient instructed to follow surgeon's bowel prep/preoperative diet instructions  -Incentive spirometer provided, education given  -Medication list emailed to clinical pharmacist   -Instructed patient to not take any oral medications in the morning of surgery  -Educated on infection prevention, surgical scrub instructions given (written/verbal)     OPIOID RISK TOOL    KARLIE EACH BOX THAT APPLIES AND ADD TOTALS AT THE END    FAMILY HISTORY OF SUBSTANCE ABUSE                 FEMALE         MALE                                                Alcohol                             [  ]1 pt          [  ]3pts                                               Illegal Drugs                     [  ]2 pts        [  ]3pts                                               Rx Drugs                           [  ]4 pts        [  ]4 pts    PERSONAL HISTORY OF SUBSTANCE ABUSE                                                                                          Alcohol                             [  ]3 pts       [  ]3 pts                                               Illegal Drugs                     [  ]4 pts        [  ]4 pts                                               Rx Drugs                           [  ]5 pts        [  ]5 pts    AGE BETWEEN 16-45 YEARS                                      [  ]1 pt         [x  ]1 pt    HISTORY OF PREADOLESCENT   SEXUAL ABUSE                                                             [  ]3 pts        [  ]0pts    PSYCHOLOGICAL DISEASE                     ADD, OCD, Bipolar, Schizophrenia        [  ]2 pts         [  ]2 pts                      Depression                                               [  ]1 pt           [  ]1 pt           SCORING TOTAL   (add numbers and type here)              ( 1 )                                     A score of 3 or lower indicated LOW risk for future opioid abuse  A score of 4 to 7 indicated moderate risk for future opioid abuse  A score of 8 or higher indicates a high risk for opioid abuse    CAPRINI VTE 2.0 SCORE [CLOT updated 2019]    AGE RELATED RISK FACTORS                                                       MOBILITY RELATED FACTORS  [ ] Age 41-60 years                                            (1 Point)                    [ ] Bed rest                                                        (1 Point)  [ ] Age: 61-74 years                                           (2 Points)                  [ ] Plaster cast                                                   (2 Points)  [ ] Age= 75 years                                              (3 Points)                    [ ] Bed bound for more than 72 hours                 (2 Points)    DISEASE RELATED RISK FACTORS                                               GENDER SPECIFIC FACTORS  [ ] Edema in the lower extremities                       (1 Point)              [ ] Pregnancy                                                     (1 Point)  [ ] Varicose veins                                               (1 Point)                     [ ] Post-partum < 6 weeks                                   (1 Point)             [x ] BMI > 25 Kg/m2                                            (1 Point)                     [ ] Hormonal therapy  or oral contraception          (1 Point)                 [ ] Sepsis (in the previous month)                        (1 Point)               [ ] History of pregnancy complications                 (1 point)  [ ] Pneumonia or serious lung disease                                               [ ] Unexplained or recurrent                     (1 Point)           (in the previous month)                               (1 Point)  [ ] Abnormal pulmonary function test                     (1 Point)                 SURGERY RELATED RISK FACTORS  [ ] Acute myocardial infarction                              (1 Point)               [ ]  Section                                             (1 Point)  [ ] Congestive heart failure (in the previous month)  (1 Point)      [ ] Minor surgery                                                  (1 Point)   [ ] Inflammatory bowel disease                             (1 Point)               [ ] Arthroscopic surgery                                        (2 Points)  [ ] Central venous access                                      (2 Points)                [ x] General surgery lasting more than 45 minutes (2 points)  [ ] Malignancy- Present or previous                   (2 Points)                [ ] Elective arthroplasty                                         (5 points)    [ ] Stroke (in the previous month)                          (5 Points)                                                                                                                                                           HEMATOLOGY RELATED FACTORS                                                 TRAUMA RELATED RISK FACTORS  [ ] Prior episodes of VTE                                     (3 Points)                [ ] Fracture of the hip, pelvis, or leg                       (5 Points)  [ ] Positive family history for VTE                         (3 Points)             [ ] Acute spinal cord injury (in the previous month)  (5 Points)  [ ] Prothrombin 18939 A                                     (3 Points)               [ ] Paralysis  (less than 1 month)                             (5 Points)  [ ] Factor V Leiden                                             (3 Points)                  [ ] Multiple Trauma within 1 month                        (5 Points)  [ ] Lupus anticoagulants                                     (3 Points)                                                           [ ] Anticardiolipin antibodies                               (3 Points)                                                       [ ] High homocysteine in the blood                      (3 Points)                                             [ ] Other congenital or acquired thrombophilia      (3 Points)                                                [ ] Heparin induced thrombocytopenia                  (3 Points)                                     Total Score [    3      ] 38 yo M PMH of HTN, SERGE on CPAP, GERD, prediabetes, morbid obesity (height 5'5", weight 220.4 lbs, BMI 36.7), presents for preop assessment prior to weight loss surgery. Patient has been more than 75 lb overweight for greater than 5 years. He had tried numerous weight loss programs including self-directed and physician supervised diets and self-directed exercise, lost greater than 10 pounds on more than one occasion, however, has experienced weight regain despite his best efforts with healthy diet and exercise. Scheduled for robotic sleeve gastrectomy w/Dr Ortiz on 12/10/2024, pending medical and pulmonary clearances    -Patient educated on ERP protocol (written/verbal)   -Patient instructed to follow surgeon's bowel prep/preoperative diet instructions  -Incentive spirometer provided, education given  -Medication list emailed to clinical pharmacist   -Instructed patient to not take any oral medications in the morning of surgery  -Educated on infection prevention, surgical scrub instructions given (written/verbal)     OPIOID RISK TOOL    KARLIE EACH BOX THAT APPLIES AND ADD TOTALS AT THE END    FAMILY HISTORY OF SUBSTANCE ABUSE                 FEMALE         MALE                                                Alcohol                             [  ]1 pt          [  ]3pts                                               Illegal Drugs                     [  ]2 pts        [  ]3pts                                               Rx Drugs                           [  ]4 pts        [  ]4 pts    PERSONAL HISTORY OF SUBSTANCE ABUSE                                                                                          Alcohol                             [  ]3 pts       [  ]3 pts                                               Illegal Drugs                     [  ]4 pts        [  ]4 pts                                               Rx Drugs                           [  ]5 pts        [  ]5 pts    AGE BETWEEN 16-45 YEARS                                      [  ]1 pt         [x  ]1 pt    HISTORY OF PREADOLESCENT   SEXUAL ABUSE                                                             [  ]3 pts        [  ]0pts    PSYCHOLOGICAL DISEASE                     ADD, OCD, Bipolar, Schizophrenia        [  ]2 pts         [  ]2 pts                      Depression                                               [  ]1 pt           [  ]1 pt           SCORING TOTAL   (add numbers and type here)              ( 1 )                                     A score of 3 or lower indicated LOW risk for future opioid abuse  A score of 4 to 7 indicated moderate risk for future opioid abuse  A score of 8 or higher indicates a high risk for opioid abuse    CAPRINI VTE 2.0 SCORE [CLOT updated 2019]    AGE RELATED RISK FACTORS                                                       MOBILITY RELATED FACTORS  [ ] Age 41-60 years                                            (1 Point)                    [ ] Bed rest                                                        (1 Point)  [ ] Age: 61-74 years                                           (2 Points)                  [ ] Plaster cast                                                   (2 Points)  [ ] Age= 75 years                                              (3 Points)                    [ ] Bed bound for more than 72 hours                 (2 Points)    DISEASE RELATED RISK FACTORS                                               GENDER SPECIFIC FACTORS  [ ] Edema in the lower extremities                       (1 Point)              [ ] Pregnancy                                                     (1 Point)  [ ] Varicose veins                                               (1 Point)                     [ ] Post-partum < 6 weeks                                   (1 Point)             [x ] BMI > 25 Kg/m2                                            (1 Point)                     [ ] Hormonal therapy  or oral contraception          (1 Point)                 [ ] Sepsis (in the previous month)                        (1 Point)               [ ] History of pregnancy complications                 (1 point)  [ ] Pneumonia or serious lung disease                                               [ ] Unexplained or recurrent                     (1 Point)           (in the previous month)                               (1 Point)  [ ] Abnormal pulmonary function test                     (1 Point)                 SURGERY RELATED RISK FACTORS  [ ] Acute myocardial infarction                              (1 Point)               [ ]  Section                                             (1 Point)  [ ] Congestive heart failure (in the previous month)  (1 Point)      [ ] Minor surgery                                                  (1 Point)   [ ] Inflammatory bowel disease                             (1 Point)               [ ] Arthroscopic surgery                                        (2 Points)  [ ] Central venous access                                      (2 Points)                [ x] General surgery lasting more than 45 minutes (2 points)  [ ] Malignancy- Present or previous                   (2 Points)                [ ] Elective arthroplasty                                         (5 points)    [ ] Stroke (in the previous month)                          (5 Points)                                                                                                                                                           HEMATOLOGY RELATED FACTORS                                                 TRAUMA RELATED RISK FACTORS  [ ] Prior episodes of VTE                                     (3 Points)                [ ] Fracture of the hip, pelvis, or leg                       (5 Points)  [ ] Positive family history for VTE                         (3 Points)             [ ] Acute spinal cord injury (in the previous month)  (5 Points)  [ ] Prothrombin 14372 A                                     (3 Points)               [ ] Paralysis  (less than 1 month)                             (5 Points)  [ ] Factor V Leiden                                             (3 Points)                  [ ] Multiple Trauma within 1 month                        (5 Points)  [ ] Lupus anticoagulants                                     (3 Points)                                                           [ ] Anticardiolipin antibodies                               (3 Points)                                                       [ ] High homocysteine in the blood                      (3 Points)                                             [ ] Other congenital or acquired thrombophilia      (3 Points)                                                [ ] Heparin induced thrombocytopenia                  (3 Points)                                     Total Score [    3      ]

## 2024-11-27 ENCOUNTER — APPOINTMENT (OUTPATIENT)
Dept: SURGERY | Facility: CLINIC | Age: 39
End: 2024-11-27
Payer: COMMERCIAL

## 2024-11-27 VITALS
DIASTOLIC BLOOD PRESSURE: 84 MMHG | WEIGHT: 211.2 LBS | BODY MASS INDEX: 35.62 KG/M2 | RESPIRATION RATE: 16 BRPM | TEMPERATURE: 98.1 F | HEART RATE: 83 BPM | HEIGHT: 64.5 IN | OXYGEN SATURATION: 98 % | SYSTOLIC BLOOD PRESSURE: 134 MMHG

## 2024-11-27 DIAGNOSIS — E66.01 MORBID (SEVERE) OBESITY DUE TO EXCESS CALORIES: ICD-10-CM

## 2024-11-27 PROBLEM — K21.9 GASTRO-ESOPHAGEAL REFLUX DISEASE WITHOUT ESOPHAGITIS: Chronic | Status: ACTIVE | Noted: 2024-11-20

## 2024-11-27 PROBLEM — G47.33 OBSTRUCTIVE SLEEP APNEA (ADULT) (PEDIATRIC): Chronic | Status: ACTIVE | Noted: 2024-11-20

## 2024-11-27 PROBLEM — R73.03 PREDIABETES: Chronic | Status: ACTIVE | Noted: 2024-11-20

## 2024-11-27 PROBLEM — I10 ESSENTIAL (PRIMARY) HYPERTENSION: Chronic | Status: ACTIVE | Noted: 2024-11-20

## 2024-11-27 PROCEDURE — 99214 OFFICE O/P EST MOD 30 MIN: CPT

## 2024-12-03 NOTE — PHARMACOTHERAPY INTERVENTION NOTE - COMMENTS
Sleeve gastrectomy scheduled for 12/10/2024.  Patient medication reconciliation completed. Patient currently taking:   HOME MEDICATIONS:  ergocalciferol 1.25 mg (50,000 intl units) oral tablet: 1 tab(s) orally once a week on wednesdays (03 Dec 2024 12:39)    Patient was informed to take daily multivitamins post surgery. Patient reeducated on NSAID avoidance (ibuprofen, ASA, naproxen, aleve) as they have increased risk of GI bleeding; may use APAP for mild pain otherwise contact prescriber for consult. Patient was informed on indications and directions for administration for hyoscyamine SL, acetaminophen, ondansetron ODT, and omeprazole DRIvette Patient was instructed to take the medications as follows:    Patient to hold vit D for 1 week prior to procedure. No other home medications.     Sleeve gastrectomy scheduled for 12/10/2024.  services utilized, ID #[id number].  Patient medication reconciliation completed. Patient currently taking:   HOME MEDICATIONS:  ergocalciferol 1.25 mg (50,000 intl units) oral tablet: 1 tab(s) orally once a week on wednesdays (03 Dec 2024 12:39)    Patient was informed to take daily multivitamins post surgery. Patient reeducated on NSAID avoidance (ibuprofen, ASA, naproxen, aleve) as they have increased risk of GI bleeding; may use APAP for mild pain otherwise contact prescriber for consult. Patient was informed on indications and directions for administration for hyoscyamine SL, acetaminophen, ondansetron ODT, and omeprazole  Patient was instructed to take the medications as follows:    Patient to hold vit D for 1 week prior to procedure. No other home medications.

## 2024-12-04 ENCOUNTER — APPOINTMENT (OUTPATIENT)
Dept: PULMONOLOGY | Facility: CLINIC | Age: 39
End: 2024-12-04

## 2024-12-09 RX ORDER — FENTANYL 12 UG/H
25 PATCH, EXTENDED RELEASE TRANSDERMAL
Refills: 0 | Status: DISCONTINUED | OUTPATIENT
Start: 2024-12-10 | End: 2024-12-10

## 2024-12-09 RX ORDER — ONDANSETRON HYDROCHLORIDE 4 MG/1
4 TABLET, FILM COATED ORAL ONCE
Refills: 0 | Status: DISCONTINUED | OUTPATIENT
Start: 2024-12-10 | End: 2024-12-10

## 2024-12-09 RX ORDER — FENTANYL 12 UG/H
50 PATCH, EXTENDED RELEASE TRANSDERMAL
Refills: 0 | Status: DISCONTINUED | OUTPATIENT
Start: 2024-12-10 | End: 2024-12-10

## 2024-12-09 NOTE — ASU PATIENT PROFILE, ADULT - NSICDXPASTMEDICALHX_GEN_ALL_CORE_FT
PAST MEDICAL HISTORY:  GERD (gastroesophageal reflux disease)     HTN (hypertension)     Morbid (severe) obesity due to excess calories     SERGE (obstructive sleep apnea)     Prediabetes

## 2024-12-10 ENCOUNTER — TRANSCRIPTION ENCOUNTER (OUTPATIENT)
Age: 39
End: 2024-12-10

## 2024-12-10 ENCOUNTER — INPATIENT (INPATIENT)
Facility: HOSPITAL | Age: 39
LOS: 0 days | Discharge: HOME | DRG: 621 | End: 2024-12-11
Attending: SURGERY | Admitting: SURGERY
Payer: COMMERCIAL

## 2024-12-10 ENCOUNTER — RESULT REVIEW (OUTPATIENT)
Age: 39
End: 2024-12-10

## 2024-12-10 ENCOUNTER — APPOINTMENT (OUTPATIENT)
Dept: SURGERY | Facility: HOSPITAL | Age: 39
End: 2024-12-10

## 2024-12-10 VITALS
OXYGEN SATURATION: 100 % | HEIGHT: 65 IN | RESPIRATION RATE: 15 BRPM | DIASTOLIC BLOOD PRESSURE: 85 MMHG | WEIGHT: 201.06 LBS | SYSTOLIC BLOOD PRESSURE: 134 MMHG | HEART RATE: 82 BPM | TEMPERATURE: 97 F

## 2024-12-10 DIAGNOSIS — Z98.890 OTHER SPECIFIED POSTPROCEDURAL STATES: Chronic | ICD-10-CM

## 2024-12-10 DIAGNOSIS — E66.01 MORBID (SEVERE) OBESITY DUE TO EXCESS CALORIES: ICD-10-CM

## 2024-12-10 LAB
ANION GAP SERPL CALC-SCNC: 13 MMOL/L — SIGNIFICANT CHANGE UP (ref 5–17)
BASOPHILS # BLD AUTO: 0.03 K/UL — SIGNIFICANT CHANGE UP (ref 0–0.2)
BASOPHILS NFR BLD AUTO: 0.3 % — SIGNIFICANT CHANGE UP (ref 0–2)
BLD GP AB SCN SERPL QL: SIGNIFICANT CHANGE UP
BUN SERPL-MCNC: 13.2 MG/DL — SIGNIFICANT CHANGE UP (ref 8–20)
CALCIUM SERPL-MCNC: 8.8 MG/DL — SIGNIFICANT CHANGE UP (ref 8.4–10.5)
CHLORIDE SERPL-SCNC: 101 MMOL/L — SIGNIFICANT CHANGE UP (ref 96–108)
CO2 SERPL-SCNC: 21 MMOL/L — LOW (ref 22–29)
CREAT SERPL-MCNC: 0.73 MG/DL — SIGNIFICANT CHANGE UP (ref 0.5–1.3)
EGFR: 120 ML/MIN/1.73M2 — SIGNIFICANT CHANGE UP
EOSINOPHIL # BLD AUTO: 0.05 K/UL — SIGNIFICANT CHANGE UP (ref 0–0.5)
EOSINOPHIL NFR BLD AUTO: 0.5 % — SIGNIFICANT CHANGE UP (ref 0–6)
GLUCOSE BLDC GLUCOMTR-MCNC: 121 MG/DL — HIGH (ref 70–99)
GLUCOSE BLDC GLUCOMTR-MCNC: 85 MG/DL — SIGNIFICANT CHANGE UP (ref 70–99)
GLUCOSE BLDC GLUCOMTR-MCNC: 91 MG/DL — SIGNIFICANT CHANGE UP (ref 70–99)
GLUCOSE SERPL-MCNC: 120 MG/DL — HIGH (ref 70–99)
HCT VFR BLD CALC: 38.7 % — LOW (ref 39–50)
HGB BLD-MCNC: 12.8 G/DL — LOW (ref 13–17)
IMM GRANULOCYTES NFR BLD AUTO: 0.3 % — SIGNIFICANT CHANGE UP (ref 0–0.9)
LYMPHOCYTES # BLD AUTO: 1.88 K/UL — SIGNIFICANT CHANGE UP (ref 1–3.3)
LYMPHOCYTES # BLD AUTO: 17.8 % — SIGNIFICANT CHANGE UP (ref 13–44)
MCHC RBC-ENTMCNC: 28.3 PG — SIGNIFICANT CHANGE UP (ref 27–34)
MCHC RBC-ENTMCNC: 33.1 G/DL — SIGNIFICANT CHANGE UP (ref 32–36)
MCV RBC AUTO: 85.6 FL — SIGNIFICANT CHANGE UP (ref 80–100)
MONOCYTES # BLD AUTO: 0.59 K/UL — SIGNIFICANT CHANGE UP (ref 0–0.9)
MONOCYTES NFR BLD AUTO: 5.6 % — SIGNIFICANT CHANGE UP (ref 2–14)
NEUTROPHILS # BLD AUTO: 7.96 K/UL — HIGH (ref 1.8–7.4)
NEUTROPHILS NFR BLD AUTO: 75.5 % — SIGNIFICANT CHANGE UP (ref 43–77)
PLATELET # BLD AUTO: 349 K/UL — SIGNIFICANT CHANGE UP (ref 150–400)
POTASSIUM SERPL-MCNC: 4.5 MMOL/L — SIGNIFICANT CHANGE UP (ref 3.5–5.3)
POTASSIUM SERPL-SCNC: 4.5 MMOL/L — SIGNIFICANT CHANGE UP (ref 3.5–5.3)
RBC # BLD: 4.52 M/UL — SIGNIFICANT CHANGE UP (ref 4.2–5.8)
RBC # FLD: 12.6 % — SIGNIFICANT CHANGE UP (ref 10.3–14.5)
SODIUM SERPL-SCNC: 135 MMOL/L — SIGNIFICANT CHANGE UP (ref 135–145)
WBC # BLD: 10.54 K/UL — HIGH (ref 3.8–10.5)
WBC # FLD AUTO: 10.54 K/UL — HIGH (ref 3.8–10.5)

## 2024-12-10 PROCEDURE — S2900 ROBOTIC SURGICAL SYSTEM: CPT | Mod: NC

## 2024-12-10 PROCEDURE — 88307 TISSUE EXAM BY PATHOLOGIST: CPT | Mod: 26

## 2024-12-10 PROCEDURE — 43775 LAP SLEEVE GASTRECTOMY: CPT

## 2024-12-10 DEVICE — XI STAPLER SUREFORM RELOAD 60 BLUE: Type: IMPLANTABLE DEVICE | Status: FUNCTIONAL

## 2024-12-10 DEVICE — TISSEEL 4ML: Type: IMPLANTABLE DEVICE | Status: FUNCTIONAL

## 2024-12-10 DEVICE — CLIP APPLIER COVIDIEN ENDOCLIP III 5MM: Type: IMPLANTABLE DEVICE | Status: FUNCTIONAL

## 2024-12-10 DEVICE — XI STAPLER SUREFORM RELOAD 60 WHITE: Type: IMPLANTABLE DEVICE | Status: FUNCTIONAL

## 2024-12-10 RX ORDER — 0.9 % SODIUM CHLORIDE 0.9 %
1000 INTRAVENOUS SOLUTION INTRAVENOUS
Refills: 0 | Status: DISCONTINUED | OUTPATIENT
Start: 2024-12-10 | End: 2024-12-10

## 2024-12-10 RX ORDER — ONDANSETRON HYDROCHLORIDE 4 MG/1
4 TABLET, FILM COATED ORAL EVERY 6 HOURS
Refills: 0 | Status: DISCONTINUED | OUTPATIENT
Start: 2024-12-10 | End: 2024-12-11

## 2024-12-10 RX ORDER — PANTOPRAZOLE SODIUM 40 MG/1
40 TABLET, DELAYED RELEASE ORAL EVERY 24 HOURS
Refills: 0 | Status: DISCONTINUED | OUTPATIENT
Start: 2024-12-10 | End: 2024-12-11

## 2024-12-10 RX ORDER — ERGOCALCIFEROL (VITAMIN D2) 200 MCG/ML
1 DROPS ORAL
Refills: 0 | DISCHARGE

## 2024-12-10 RX ORDER — METOCLOPRAMIDE HYDROCHLORIDE 10 MG/1
10 TABLET ORAL EVERY 6 HOURS
Refills: 0 | Status: DISCONTINUED | OUTPATIENT
Start: 2024-12-10 | End: 2024-12-11

## 2024-12-10 RX ORDER — HEPARIN SODIUM,PORCINE 1000/ML
5000 VIAL (ML) INJECTION ONCE
Refills: 0 | Status: COMPLETED | OUTPATIENT
Start: 2024-12-10 | End: 2024-12-10

## 2024-12-10 RX ORDER — LORAZEPAM 2 MG/1
0.5 TABLET ORAL ONCE
Refills: 0 | Status: DISCONTINUED | OUTPATIENT
Start: 2024-12-10 | End: 2024-12-11

## 2024-12-10 RX ORDER — HYOSCYAMINE SULFATE 0.125 MG
0.12 TABLET, SUBLINGUAL SUBLINGUAL EVERY 4 HOURS
Refills: 0 | Status: DISCONTINUED | OUTPATIENT
Start: 2024-12-10 | End: 2024-12-11

## 2024-12-10 RX ORDER — HEPARIN SODIUM,PORCINE 1000/ML
5000 VIAL (ML) INJECTION EVERY 8 HOURS
Refills: 0 | Status: DISCONTINUED | OUTPATIENT
Start: 2024-12-10 | End: 2024-12-11

## 2024-12-10 RX ORDER — ACETAMINOPHEN 500MG 500 MG/1
1000 TABLET, COATED ORAL ONCE
Refills: 0 | Status: COMPLETED | OUTPATIENT
Start: 2024-12-10 | End: 2024-12-10

## 2024-12-10 RX ORDER — SODIUM CHLORIDE 9 MG/ML
3 INJECTION, SOLUTION INTRAMUSCULAR; INTRAVENOUS; SUBCUTANEOUS EVERY 8 HOURS
Refills: 0 | Status: DISCONTINUED | OUTPATIENT
Start: 2024-12-10 | End: 2024-12-10

## 2024-12-10 RX ORDER — 0.9 % SODIUM CHLORIDE 0.9 %
1000 INTRAVENOUS SOLUTION INTRAVENOUS
Refills: 0 | Status: DISCONTINUED | OUTPATIENT
Start: 2024-12-10 | End: 2024-12-11

## 2024-12-10 RX ORDER — KETOROLAC TROMETHAMINE 30 MG/ML
15 INJECTION INTRAMUSCULAR; INTRAVENOUS EVERY 6 HOURS
Refills: 0 | Status: DISCONTINUED | OUTPATIENT
Start: 2024-12-10 | End: 2024-12-11

## 2024-12-10 RX ADMIN — KETOROLAC TROMETHAMINE 15 MILLIGRAM(S): 30 INJECTION INTRAMUSCULAR; INTRAVENOUS at 22:03

## 2024-12-10 RX ADMIN — Medication 0.12 MILLIGRAM(S): at 11:55

## 2024-12-10 RX ADMIN — METOCLOPRAMIDE HYDROCHLORIDE 10 MILLIGRAM(S): 10 TABLET ORAL at 11:55

## 2024-12-10 RX ADMIN — Medication 5000 UNIT(S): at 14:25

## 2024-12-10 RX ADMIN — KETOROLAC TROMETHAMINE 15 MILLIGRAM(S): 30 INJECTION INTRAMUSCULAR; INTRAVENOUS at 17:22

## 2024-12-10 RX ADMIN — KETOROLAC TROMETHAMINE 15 MILLIGRAM(S): 30 INJECTION INTRAMUSCULAR; INTRAVENOUS at 23:03

## 2024-12-10 RX ADMIN — ACETAMINOPHEN 500MG 400 MILLIGRAM(S): 500 TABLET, COATED ORAL at 14:25

## 2024-12-10 RX ADMIN — PANTOPRAZOLE SODIUM 40 MILLIGRAM(S): 40 TABLET, DELAYED RELEASE ORAL at 11:03

## 2024-12-10 RX ADMIN — ACETAMINOPHEN 500MG 1000 MILLIGRAM(S): 500 TABLET, COATED ORAL at 15:25

## 2024-12-10 RX ADMIN — ONDANSETRON HYDROCHLORIDE 4 MILLIGRAM(S): 4 TABLET, FILM COATED ORAL at 17:39

## 2024-12-10 RX ADMIN — KETOROLAC TROMETHAMINE 15 MILLIGRAM(S): 30 INJECTION INTRAMUSCULAR; INTRAVENOUS at 16:22

## 2024-12-10 RX ADMIN — Medication 5000 UNIT(S): at 23:48

## 2024-12-10 RX ADMIN — Medication 5000 UNIT(S): at 06:01

## 2024-12-10 RX ADMIN — ONDANSETRON HYDROCHLORIDE 4 MILLIGRAM(S): 4 TABLET, FILM COATED ORAL at 23:47

## 2024-12-10 NOTE — DISCHARGE NOTE PROVIDER - NSDCMRMEDTOKEN_GEN_ALL_CORE_FT
ergocalciferol 1.25 mg (50,000 intl units) oral tablet: 1 tab(s) orally once a week on wednesdays   ergocalciferol 1.25 mg (50,000 intl units) oral tablet: 1 tab(s) orally once a week on   Levsin SL 0.125 mg sublingual tablet: 1 tab(s) sublingually every 6 hours as needed for gastric spasm  omeprazole 40 mg oral delayed release capsule: 1 cap(s) orally once a day  ondansetron 4 mg oral tablet, disintegratin tab(s) orally every 6 hours as needed for  nausea MDD: 4

## 2024-12-10 NOTE — CHART NOTE - NSCHARTNOTEFT_GEN_A_CORE
Subjective: Patient seen at bedside, no current complaints, had some nausea earlier but it resolved with medications, voiding freely and ambulating independently in PACU      MEDICATIONS  (STANDING):  acetaminophen   IVPB .. 1000 milliGRAM(s) IV Intermittent once  heparin   Injectable 5000 Unit(s) SubCutaneous every 8 hours  ketorolac   Injectable 15 milliGRAM(s) IV Push every 6 hours  lactated ringers. 1000 milliLiter(s) (125 mL/Hr) IV Continuous <Continuous>  ondansetron Injectable 4 milliGRAM(s) IV Push every 6 hours  pantoprazole  Injectable 40 milliGRAM(s) IV Push every 24 hours    MEDICATIONS  (PRN):  fentaNYL    Injectable 25 MICROGram(s) IV Push every 5 minutes PRN Moderate Pain (4 - 6)  fentaNYL    Injectable 50 MICROGram(s) IV Push every 5 minutes PRN Severe Pain (7 - 10)  hyoscyamine SL 0.125 milliGRAM(s) SubLingual every 4 hours PRN Gastric Spasms  ketorolac   Injectable 15 milliGRAM(s) IV Push every 6 hours PRN breakthrough skin pain  LORazepam   Injectable 0.5 milliGRAM(s) IV Push once PRN Esophageal Spasm  metoclopramide Injectable 10 milliGRAM(s) IV Push every 6 hours PRN nausea  ondansetron Injectable 4 milliGRAM(s) IV Push once PRN Nausea and/or Vomiting      Vital Signs Last 24 Hrs  T(C): 36.7 (10 Dec 2024 10:20), Max: 36.7 (10 Dec 2024 10:20)  T(F): 98.1 (10 Dec 2024 10:20), Max: 98.1 (10 Dec 2024 10:20)  HR: 72 (10 Dec 2024 13:30) (63 - 82)  BP: 113/89 (10 Dec 2024 13:30) (111/76 - 134/85)  BP(mean): 91 (10 Dec 2024 13:00) (83 - 102)  RR: 20 (10 Dec 2024 13:30) (15 - 26)  SpO2: 100% (10 Dec 2024 13:30) (99% - 100%)    Parameters below as of 10 Dec 2024 13:30  Patient On (Oxygen Delivery Method): room air        Physical Exam:    Constitutional: NAD  HEENT: PERRL, EOMI  Respiratory: Respirations non-labored, no accessory muscle use  Gastrointestinal: Soft, appropriately tender, non-distended, incisions c/d/i with dermabond in place  Neurological: A&O x 3      LABS:                        12.8   10.54 )-----------( 349      ( 10 Dec 2024 10:54 )             38.7     12-10    135  |  101  |  13.2  ----------------------------<  120[H]  4.5   |  21.0[L]  |  0.73    Ca    8.8      10 Dec 2024 10:54        Urinalysis Basic - ( 10 Dec 2024 10:54 )    Color: x / Appearance: x / SG: x / pH: x  Gluc: 120 mg/dL / Ketone: x  / Bili: x / Urobili: x   Blood: x / Protein: x / Nitrite: x   Leuk Esterase: x / RBC: x / WBC x   Sq Epi: x / Non Sq Epi: x / Bacteria: x        A: 39M s/p sleeve gastrectomy, doing well post op    P:  AM labs  Begin Anant clear diet  Multi-modal pain control  OOB as toelrated  DVT PPX: KARLOS

## 2024-12-10 NOTE — PATIENT PROFILE ADULT - FALL HARM RISK - HARM RISK INTERVENTIONS

## 2024-12-10 NOTE — DISCHARGE NOTE PROVIDER - CARE PROVIDER_API CALL
Gus Ortiz)  Surgery  [address] [facility] [address], [address]  [place] [address]  Phone: [phone]  Fax: [phone]  Follow Up Time: 2 weeks

## 2024-12-10 NOTE — DISCHARGE NOTE PROVIDER - NSDCFUSCHEDAPPT_GEN_ALL_CORE_FT
Gus Ortiz E  [facility]  GENSURG [address]  Scheduled Appointment: 12/18/2024    [facility]  [facility] [address]  Scheduled Appointment: 01/21/2025

## 2024-12-10 NOTE — DISCHARGE NOTE PROVIDER - NSDCCPGOAL_GEN_ALL_CORE_FT
To get better and follow your care plan as instructed.
0 = understands/communicates without difficulty

## 2024-12-10 NOTE — DISCHARGE NOTE PROVIDER - NSDCCPCAREPLAN_GEN_ALL_CORE_FT
PRINCIPAL DISCHARGE DIAGNOSIS  Diagnosis: Morbid obesity  Assessment and Plan of Treatment: BATHING: Please do not submerge wound underwater. You may shower starting post op day #2. Remove outer clean dressings on post op day #5. Leave steri strips in place. They may fall off on their own. OR if you have dermabond (purple looking skin glue) do not scrub or pick. It will come off on its on. You may pat it dry after showering.  ACTIVITY: No heavy lifting or straining. Otherwise, you may return to your usual level of physical activity. You should ambulate every 4 hours while awake. It is important for your recovery process and for prevention of blood clots. If you are taking narcotic pain medication (such as Percocet) DO NOT drive a car, operate machinery or make important decisions.  DIET: Please follow Bariatric diet protocol. You may begin your full liquids when returning home. Encourage protein filled liquids.     RETURN TO THE EMERGENCY DEPARTMENT for any of the following - worsening pain, fever/chills, nausea/vomiting, altered mental status, chest pain, shortness of breath, or any other new / worsening symptom. to your usual diet.  NOTIFY YOUR SURGEON IF: You have any bleeding that does not stop, any pus draining from your wound(s), any fever (over 100.4 F) or chills, persistent nausea/vomiting, persistent diarrhea, or if your pain is not controlled on your discharge medications.  FOLLOW-UP: Please follow up with your primary care physician within 3-4weeks after surgery and your bariatric surgeon as discussed. also ensure follow up and continued communication with your dietary team and other support services.

## 2024-12-10 NOTE — DISCHARGE NOTE PROVIDER - HOSPITAL COURSE
On 12/10/2024 Mr Cai who has a history of HTN, SERGE, GERD, perdiabetes and morbid obesity BMI 36.7, underwent Robotic Assisted Sleeve Gastrectomy. Bariatric ERAS protocol followed to include preoperative and perioperative use of DVT and SSI prophylaxis as well as multi-modal non-opioid analgesia. Patient tolerated the procedure well  extubated in the operating room then transferred to the PACU in stable condition. Once hemodynamically stable and effective pain control the patient was able to ambulate with assistance. Pt was also able to void within 4 hours following the procedure. The patient was later transferred to a bariatric unit and placed on bedside continuous pulse oximetry. Pain management included IV multi-modal non-opioid analgesia then transitioned to liquid as needed. The patient tolerated bariatric clear liquid the evening of surgery.    On POD #1 patient remained stable with no acute events overnight, has effective  pain control. Denies nausea and vomiting. Patient is ambulating independently and voiding as expected. The rest of the hospital course was uneventful, patient met Bariatric Surgery D/C criteria and subsequently cleared for discharge home on POD#1. Patient noted to have a Pain score of (1-10),based on Likert pain scale. No extended VTE prophylaxis required ([facility] VTE Risk Stratification Risk  (<1%). The patient will follow a protocol-derived staged meal progression supervised by the dietitian in the outpatient setting. Patient will follow-up with Dr. Ortiz in 7-10 days, medical doctor and dietitian in 30 days. [facility] prior to discharge. Written discharge instruction explained and given. On 12/10/2024 Mr Cai who has a history of HTN, SERGE, GERD, perdiabetes and morbid obesity BMI 36.7, underwent Robotic Assisted Sleeve Gastrectomy. Bariatric ERAS protocol followed to include preoperative and perioperative use of DVT and SSI prophylaxis as well as multi-modal non-opioid analgesia. Patient tolerated the procedure well  extubated in the operating room then transferred to the PACU in stable condition. Once hemodynamically stable and effective pain control the patient was able to ambulate with assistance. Pt was also able to void within 4 hours following the procedure. The patient was later transferred to a bariatric unit and placed on bedside continuous pulse oximetry. Pain management included IV multi-modal non-opioid analgesia then transitioned to liquid as needed. The patient tolerated bariatric clear liquid the evening of surgery.    On POD #1 patient remained stable with no acute events overnight, has effective  pain control. Denies nausea and vomiting. Patient is ambulating independently and voiding as expected. The rest of the hospital course was uneventful, patient met Bariatric Surgery D/C criteria and subsequently cleared for discharge home on POD#1. Patient noted to have a Pain score of 0 out of 10, based on Likert pain scale. No extended VTE prophylaxis required ([facility] [place] [facility] VTE Risk Stratification Risk  (<1%). The patient will follow a protocol-derived staged meal progression supervised by the dietitian in the outpatient setting. Patient will follow-up with Dr. Ortzi in 7-10 days, medical doctor and dietitian in 30 days. All appropriate [facility] obtained from [facility] prior to discharge. Written discharge instruction explained and given.

## 2024-12-10 NOTE — BRIEF OPERATIVE NOTE - OPERATION/FINDINGS
No hiatal hernia seen.  Longitudinal sleeve gastrectomy formed over 36Fr [id number], with hemostatic staple line and negative leak test.

## 2024-12-11 ENCOUNTER — TRANSCRIPTION ENCOUNTER (OUTPATIENT)
Age: 39
End: 2024-12-11

## 2024-12-11 VITALS
TEMPERATURE: 98 F | DIASTOLIC BLOOD PRESSURE: 83 MMHG | SYSTOLIC BLOOD PRESSURE: 128 MMHG | OXYGEN SATURATION: 94 % | RESPIRATION RATE: 18 BRPM | HEART RATE: 78 BPM

## 2024-12-11 LAB
BASOPHILS # BLD AUTO: 0.01 K/UL — SIGNIFICANT CHANGE UP (ref 0–0.2)
BASOPHILS NFR BLD AUTO: 0.1 % — SIGNIFICANT CHANGE UP (ref 0–2)
EOSINOPHIL # BLD AUTO: 0.01 K/UL — SIGNIFICANT CHANGE UP (ref 0–0.5)
EOSINOPHIL NFR BLD AUTO: 0.1 % — SIGNIFICANT CHANGE UP (ref 0–6)
HCT VFR BLD CALC: 34.3 % — LOW (ref 39–50)
HGB BLD-MCNC: 11.5 G/DL — LOW (ref 13–17)
IMM GRANULOCYTES NFR BLD AUTO: 0.4 % — SIGNIFICANT CHANGE UP (ref 0–0.9)
LYMPHOCYTES # BLD AUTO: 1.29 K/UL — SIGNIFICANT CHANGE UP (ref 1–3.3)
LYMPHOCYTES # BLD AUTO: 13.1 % — SIGNIFICANT CHANGE UP (ref 13–44)
MCHC RBC-ENTMCNC: 28.5 PG — SIGNIFICANT CHANGE UP (ref 27–34)
MCHC RBC-ENTMCNC: 33.5 G/DL — SIGNIFICANT CHANGE UP (ref 32–36)
MCV RBC AUTO: 85.1 FL — SIGNIFICANT CHANGE UP (ref 80–100)
MONOCYTES # BLD AUTO: 0.87 K/UL — SIGNIFICANT CHANGE UP (ref 0–0.9)
MONOCYTES NFR BLD AUTO: 8.9 % — SIGNIFICANT CHANGE UP (ref 2–14)
NEUTROPHILS # BLD AUTO: 7.59 K/UL — HIGH (ref 1.8–7.4)
NEUTROPHILS NFR BLD AUTO: 77.4 % — HIGH (ref 43–77)
PLATELET # BLD AUTO: 327 K/UL — SIGNIFICANT CHANGE UP (ref 150–400)
RBC # BLD: 4.03 M/UL — LOW (ref 4.2–5.8)
RBC # FLD: 12.7 % — SIGNIFICANT CHANGE UP (ref 10.3–14.5)
WBC # BLD: 9.81 K/UL — SIGNIFICANT CHANGE UP (ref 3.8–10.5)
WBC # FLD AUTO: 9.81 K/UL — SIGNIFICANT CHANGE UP (ref 3.8–10.5)

## 2024-12-11 PROCEDURE — C9399: CPT

## 2024-12-11 PROCEDURE — S2900: CPT

## 2024-12-11 PROCEDURE — 86850 RBC ANTIBODY SCREEN: CPT

## 2024-12-11 PROCEDURE — 86900 BLOOD TYPING SEROLOGIC ABO: CPT

## 2024-12-11 PROCEDURE — 88307 TISSUE EXAM BY PATHOLOGIST: CPT

## 2024-12-11 PROCEDURE — 86901 BLOOD TYPING SEROLOGIC RH(D): CPT

## 2024-12-11 PROCEDURE — 80048 BASIC METABOLIC PNL TOTAL CA: CPT

## 2024-12-11 PROCEDURE — C1889: CPT

## 2024-12-11 PROCEDURE — 36415 COLL VENOUS BLD VENIPUNCTURE: CPT

## 2024-12-11 PROCEDURE — 85025 COMPLETE CBC W/AUTO DIFF WBC: CPT

## 2024-12-11 PROCEDURE — 82962 GLUCOSE BLOOD TEST: CPT

## 2024-12-11 RX ADMIN — KETOROLAC TROMETHAMINE 15 MILLIGRAM(S): 30 INJECTION INTRAMUSCULAR; INTRAVENOUS at 11:49

## 2024-12-11 RX ADMIN — ONDANSETRON HYDROCHLORIDE 4 MILLIGRAM(S): 4 TABLET, FILM COATED ORAL at 11:49

## 2024-12-11 RX ADMIN — PANTOPRAZOLE SODIUM 40 MILLIGRAM(S): 40 TABLET, DELAYED RELEASE ORAL at 11:49

## 2024-12-11 RX ADMIN — Medication 5000 UNIT(S): at 09:25

## 2024-12-11 RX ADMIN — ONDANSETRON HYDROCHLORIDE 4 MILLIGRAM(S): 4 TABLET, FILM COATED ORAL at 05:26

## 2024-12-11 RX ADMIN — KETOROLAC TROMETHAMINE 15 MILLIGRAM(S): 30 INJECTION INTRAMUSCULAR; INTRAVENOUS at 05:27

## 2024-12-11 RX ADMIN — KETOROLAC TROMETHAMINE 15 MILLIGRAM(S): 30 INJECTION INTRAMUSCULAR; INTRAVENOUS at 06:27

## 2024-12-11 NOTE — DISCHARGE NOTE NURSING/CASE MANAGEMENT/SOCIAL WORK - FINANCIAL ASSISTANCE
[facility] provides services at a reduced cost to those who are determined to be eligible through [facility]’s financial assistance program. Information regarding [facility]’s financial assistance program can be found by going to [email or web address] or by calling [phone].

## 2024-12-11 NOTE — PHARMACOTHERAPY INTERVENTION NOTE - COMMENTS
Reinforced discussion points from previous counseling. Informed patient of new prescriptions sent to pharmacy.   Reinforced discussion points from previous counseling. Informed patient of new prescriptions sent to pharmacy.  services utilized, ID #[id number].

## 2024-12-11 NOTE — PROGRESS NOTE ADULT - SUBJECTIVE AND OBJECTIVE BOX
Subjective: patient evaluated and examined at the bedside. no overnight events. patient reports pain is 0/10. Denies any nausea, vomiting, or abdominal pain. tolerating chun clears     STATUS POST:  RA sleeve gastrectomy     POST OPERATIVE DAY #: 1    MEDICATIONS  (STANDING):  heparin   Injectable 5000 Unit(s) SubCutaneous every 8 hours  ketorolac   Injectable 15 milliGRAM(s) IV Push every 6 hours  lactated ringers. 1000 milliLiter(s) (125 mL/Hr) IV Continuous <Continuous>  ondansetron Injectable 4 milliGRAM(s) IV Push every 6 hours  pantoprazole  Injectable 40 milliGRAM(s) IV Push every 24 hours    MEDICATIONS  (PRN):  hyoscyamine SL 0.125 milliGRAM(s) SubLingual every 4 hours PRN Gastric Spasms  ketorolac   Injectable 15 milliGRAM(s) IV Push every 6 hours PRN breakthrough skin pain  LORazepam   Injectable 0.5 milliGRAM(s) IV Push once PRN Esophageal Spasm  metoclopramide Injectable 10 milliGRAM(s) IV Push every 6 hours PRN nausea      Vital Signs Last 24 Hrs  T(C): 37 (11 Dec 2024 04:30), Max: 37 (11 Dec 2024 04:30)  T(F): 98.6 (11 Dec 2024 04:30), Max: 98.6 (11 Dec 2024 04:30)  HR: 70 (11 Dec 2024 04:30) (56 - 72)  BP: 143/80 (11 Dec 2024 04:30) (111/76 - 143/80)  BP(mean): 91 (10 Dec 2024 13:00) (83 - 102)  RR: 18 (11 Dec 2024 04:30) (15 - 26)  SpO2: 96% (11 Dec 2024 04:30) (95% - 100%)    Parameters below as of 11 Dec 2024 04:30  Patient On (Oxygen Delivery Method): room air        Physical Exam:    Constitutional: NAD  Respiratory: Respirations non-labored, no accessory muscle use  Gastrointestinal: incisions c/d/i, abdomen soft, non-tender, non-distended        LABS:                        11.5   9.81  )-----------( 327      ( 11 Dec 2024 05:22 )             34.3     12-10    135  |  101  |  13.2  ----------------------------<  120[H]  4.5   |  21.0[L]  |  0.73    Ca    8.8      10 Dec 2024 10:54        Urinalysis Basic - ( 10 Dec 2024 10:54 )    Color: x / Appearance: x / SG: x / pH: x  Gluc: 120 mg/dL / Ketone: x  / Bili: x / Urobili: x   Blood: x / Protein: x / Nitrite: x   Leuk Esterase: x / RBC: x / WBC x   Sq Epi: x / Non Sq Epi: x / Bacteria: x        A: 39M POD#1 s/p RA sleeve gastrectomy, tolerating chun clears. Voiding as expected and ambulating without difficulty. Pain well controlled. Hgb 11.5 from 12.8 this am.     Plan:   - dvt ppx: SQH, SCDs  - diet: chun clears   - MM pain control  - encourage oob and IS   - monitor tachycardia   - strict I/Os   - dispo: DC home today

## 2024-12-11 NOTE — DISCHARGE NOTE NURSING/CASE MANAGEMENT/SOCIAL WORK - PATIENT PORTAL LINK FT
You can access the FollowMyHealth Patient Portal offered by [facility] by registering at the following website: [email or web address]. By joining [facility]’s FollowMyHealth portal, you will also be able to view your health information using other applications (apps) compatible with our system.

## 2024-12-11 NOTE — CHART NOTE - NSCHARTNOTEFT_GEN_A_CORE
Consult received for bariatric surgery diet education. Pt is now s/p sleeve gastrectomy. Pt [place] speaking -  services used (ID# [id number]). Met with pt at bedside this morning. Tolerating clear liquid diet well, pt reports feeling well. Nutrition Guidelines After Laparoscopic Sleeve Gastrectomy handout provided and reviewed with patient. Pt had no nutrition questions or concerns at this time, made aware RD remains available if needed.

## 2024-12-11 NOTE — DISCHARGE NOTE NURSING/CASE MANAGEMENT/SOCIAL WORK - NSDCPEFALRISK_GEN_ALL_CORE
For information on Fall & Injury Prevention, visit: [email or web address] OR  [email or web address] OR  https://www.cdc.gov/steadi/patient.html

## 2024-12-13 ENCOUNTER — NON-APPOINTMENT (OUTPATIENT)
Age: 39
End: 2024-12-13

## 2024-12-18 ENCOUNTER — APPOINTMENT (OUTPATIENT)
Dept: SURGERY | Facility: CLINIC | Age: 39
End: 2024-12-18
Payer: COMMERCIAL

## 2024-12-18 VITALS
RESPIRATION RATE: 16 BRPM | HEART RATE: 83 BPM | WEIGHT: 189 LBS | BODY MASS INDEX: 31.87 KG/M2 | OXYGEN SATURATION: 98 % | TEMPERATURE: 98 F | HEIGHT: 64.5 IN | SYSTOLIC BLOOD PRESSURE: 103 MMHG | DIASTOLIC BLOOD PRESSURE: 70 MMHG

## 2024-12-18 DIAGNOSIS — E66.01 MORBID (SEVERE) OBESITY DUE TO EXCESS CALORIES: ICD-10-CM

## 2024-12-18 LAB — SURGICAL PATHOLOGY STUDY: SIGNIFICANT CHANGE UP

## 2024-12-18 PROCEDURE — 99024 POSTOP FOLLOW-UP VISIT: CPT

## 2025-01-07 ENCOUNTER — APPOINTMENT (OUTPATIENT)
Dept: SURGERY | Facility: CLINIC | Age: 40
End: 2025-01-07

## 2025-01-21 ENCOUNTER — APPOINTMENT (OUTPATIENT)
Dept: BARIATRICS | Facility: CLINIC | Age: 40
End: 2025-01-21
Payer: COMMERCIAL

## 2025-01-21 ENCOUNTER — APPOINTMENT (OUTPATIENT)
Dept: SURGERY | Facility: CLINIC | Age: 40
End: 2025-01-21
Payer: COMMERCIAL

## 2025-01-21 VITALS
BODY MASS INDEX: 29.55 KG/M2 | SYSTOLIC BLOOD PRESSURE: 114 MMHG | HEART RATE: 98 BPM | RESPIRATION RATE: 16 BRPM | DIASTOLIC BLOOD PRESSURE: 74 MMHG | OXYGEN SATURATION: 99 % | TEMPERATURE: 98.2 F | HEIGHT: 64.5 IN | WEIGHT: 175.2 LBS

## 2025-01-21 DIAGNOSIS — E66.01 MORBID (SEVERE) OBESITY DUE TO EXCESS CALORIES: ICD-10-CM

## 2025-01-21 DIAGNOSIS — E66.9 OBESITY, UNSPECIFIED: ICD-10-CM

## 2025-01-21 DIAGNOSIS — Z98.84 BARIATRIC SURGERY STATUS: ICD-10-CM

## 2025-01-21 PROCEDURE — 99214 OFFICE O/P EST MOD 30 MIN: CPT

## 2025-01-21 PROCEDURE — 97803 MED NUTRITION INDIV SUBSEQ: CPT | Mod: 95

## 2025-01-22 ENCOUNTER — APPOINTMENT (OUTPATIENT)
Dept: SURGERY | Facility: CLINIC | Age: 40
End: 2025-01-22

## 2025-03-26 ENCOUNTER — APPOINTMENT (OUTPATIENT)
Dept: SURGERY | Facility: CLINIC | Age: 40
End: 2025-03-26
Payer: COMMERCIAL

## 2025-03-26 VITALS
SYSTOLIC BLOOD PRESSURE: 107 MMHG | DIASTOLIC BLOOD PRESSURE: 70 MMHG | OXYGEN SATURATION: 99 % | TEMPERATURE: 97.9 F | HEIGHT: 64.5 IN | RESPIRATION RATE: 16 BRPM | HEART RATE: 69 BPM | BODY MASS INDEX: 28 KG/M2 | WEIGHT: 166 LBS

## 2025-03-26 DIAGNOSIS — Z98.84 BARIATRIC SURGERY STATUS: ICD-10-CM

## 2025-03-26 PROCEDURE — 99214 OFFICE O/P EST MOD 30 MIN: CPT

## 2025-06-25 ENCOUNTER — APPOINTMENT (OUTPATIENT)
Dept: SURGERY | Facility: CLINIC | Age: 40
End: 2025-06-25
Payer: COMMERCIAL

## 2025-06-25 VITALS
SYSTOLIC BLOOD PRESSURE: 123 MMHG | DIASTOLIC BLOOD PRESSURE: 79 MMHG | BODY MASS INDEX: 28.87 KG/M2 | HEIGHT: 63.5 IN | WEIGHT: 165 LBS | RESPIRATION RATE: 16 BRPM | OXYGEN SATURATION: 98 % | TEMPERATURE: 98 F | HEART RATE: 60 BPM

## 2025-06-25 PROCEDURE — 99214 OFFICE O/P EST MOD 30 MIN: CPT

## (undated) DEVICE — INSUFFLATION NDL COVIDIEN STEP 14G FOR STEP/VERSASTEP

## (undated) DEVICE — SYR SLIP 10CC

## (undated) DEVICE — APPLICATOR DUPLOSPRAY 40CM DISP

## (undated) DEVICE — VENODYNE/SCD SLEEVE CALF LARGE

## (undated) DEVICE — SOL IRR BAG NS 0.9% 1000ML

## (undated) DEVICE — GOWN TRIMAX LG

## (undated) DEVICE — XI CORD MONOPOLAR CAUTERY (GREEN)

## (undated) DEVICE — NDL HYPO SAFE 22G X 1.5" (BLACK)

## (undated) DEVICE — DRAPE GENERAL ENDOSCOPY

## (undated) DEVICE — XI ARM FORCEP CADIERE 8MM

## (undated) DEVICE — DRSG 2X2

## (undated) DEVICE — DRSG MASTISOL

## (undated) DEVICE — XI ARM GRASPER TIP UP FENESTRATED

## (undated) DEVICE — DRAPE TOWEL BLUE 17" X 24"

## (undated) DEVICE — TUBING ALARIS PUMP MODULE NON-DEHP

## (undated) DEVICE — XI STAPLER SUREFORM 60

## (undated) DEVICE — SENSOR O2 FINGER ADULT

## (undated) DEVICE — D HELP - CLEARVIEW CLEARIFY SYSTEM

## (undated) DEVICE — ELCTR GROUNDING PAD ADULT COVIDIEN

## (undated) DEVICE — STAPLER SKIN PROXIMATE

## (undated) DEVICE — WARMING BLANKET UPPER ADULT

## (undated) DEVICE — XI DRAPE COLUMN

## (undated) DEVICE — UNDERPAD LINEN SAVER 23 X 36"

## (undated) DEVICE — PACK ROBOTIC

## (undated) DEVICE — GOWN IMPERV XL

## (undated) DEVICE — DRSG STERISTRIPS 0.5 X 4"

## (undated) DEVICE — VENODYNE/SCD SLEEVE CALF MEDIUM

## (undated) DEVICE — TAPE SILK 3"

## (undated) DEVICE — GLV 7.5 PROTEXIS (WHITE)

## (undated) DEVICE — ELCTR BOVIE PENCIL SMOKE EVACUATION 15FT

## (undated) DEVICE — SUT VICRYL 0 27" UR-6

## (undated) DEVICE — FORCEP RADIAL JAW 4 W NDL 2.4MM 2.8MM 240CM ORANGE DISP

## (undated) DEVICE — SOL IRR BAG H2O 1000ML

## (undated) DEVICE — POSITIONER SAGE MOBILITY TRANSFER PAD

## (undated) DEVICE — SOL IRR POUR NS 0.9% 1000ML

## (undated) DEVICE — SOL IRR POUR H2O 1000ML

## (undated) DEVICE — PACK IV START WITH CHG

## (undated) DEVICE — TUBING IV EXTENSION MACRO W CLAVE 7"

## (undated) DEVICE — SYR IV FLUSH SALINE 10ML 30/TY

## (undated) DEVICE — DENTURE CUP PINK

## (undated) DEVICE — SOL BAG NS 0.9% 1000ML

## (undated) DEVICE — IRRIGATION TRAY W PISTON SYRINGE 60ML

## (undated) DEVICE — VISIGI 3D SLEEVE GASTRECTOMY 36FR

## (undated) DEVICE — TROCAR SURGIQUEST AIRSEAL 5MM X 100MM

## (undated) DEVICE — KIT DEFENDO 4 OLY 4 PC

## (undated) DEVICE — XI VESSEL SEALER

## (undated) DEVICE — BITE BLOCK ADULT 20 X 27MM (GREEN)

## (undated) DEVICE — SYR LUER SLIP TIP 50CC

## (undated) DEVICE — MASK PROCEED EARLOOP LVL 2 50/BX

## (undated) DEVICE — XI DRAPE ARM

## (undated) DEVICE — XI OBTURATOR OPTICAL BLADELESS 8MM

## (undated) DEVICE — APPLICATOR FOR TISSEEL DUPLOTIP W SNAP LOCK 5MMX32CM

## (undated) DEVICE — XI CORD BIPOLAR CAUTERY (BLUE)

## (undated) DEVICE — WARMING BLANKET FULL ADULT

## (undated) DEVICE — XI SEAL UNIVERSIAL 5-12MM

## (undated) DEVICE — GOWN ROYAL SILK XL

## (undated) DEVICE — CATH IV SAFE BC 22G X 1" (BLUE)

## (undated) DEVICE — TUBING AIRSEAL TRI-LUMEN FILTERED

## (undated) DEVICE — GLV 7.5 PROTEXIS (BLUE)

## (undated) DEVICE — DRSG CURITY GAUZE SPONGE 4 X 4" 12-PLY NON-STERILE

## (undated) DEVICE — PREP CHLORAPREP HI-LITE ORANGE 26ML

## (undated) DEVICE — POSITIONER FOAM EGG CRATE ULNAR 2PCS (PINK)

## (undated) DEVICE — ENDOCATCH II 15MM

## (undated) DEVICE — SUT MONOCRYL 4-0 27" PS-2 UNDYED